# Patient Record
Sex: MALE | Race: WHITE | NOT HISPANIC OR LATINO | Employment: FULL TIME | URBAN - METROPOLITAN AREA
[De-identification: names, ages, dates, MRNs, and addresses within clinical notes are randomized per-mention and may not be internally consistent; named-entity substitution may affect disease eponyms.]

---

## 2021-05-31 ENCOUNTER — RECORDS - HEALTHEAST (OUTPATIENT)
Dept: ADMINISTRATIVE | Facility: CLINIC | Age: 56
End: 2021-05-31

## 2021-11-24 PROBLEM — G47.30 SLEEP APNEA WITH USE OF CONTINUOUS POSITIVE AIRWAY PRESSURE (CPAP): Status: ACTIVE | Noted: 2019-01-08

## 2021-12-09 ENCOUNTER — OFFICE VISIT (OUTPATIENT)
Dept: SURGERY | Facility: CLINIC | Age: 56
End: 2021-12-09
Payer: COMMERCIAL

## 2021-12-09 DIAGNOSIS — Z18.9 RETAINED SUTURE, INITIAL ENCOUNTER: Primary | ICD-10-CM

## 2021-12-09 DIAGNOSIS — T81.89XA RETAINED SUTURE, INITIAL ENCOUNTER: Primary | ICD-10-CM

## 2021-12-09 PROCEDURE — 99203 OFFICE O/P NEW LOW 30 MIN: CPT | Mod: 25 | Performed by: SURGERY

## 2021-12-09 PROCEDURE — 10120 INC&RMVL FB SUBQ TISS SMPL: CPT | Performed by: SURGERY

## 2021-12-10 NOTE — PROGRESS NOTES
HPI: Alexandr Dudley is a 56 year old male referred to see me by No primary care provider on file. for persistent subcutaneous sutures following umbilical hernia repair several years ago. This was a mesh-based umbilical hernia repair, with mesh fixation performed with Prolene suture. He has always been aware of the protruding tips of the sutures very shallow undersurface of the skin, and plan to have this corrected.    Allergies:Patient has no known allergies.    Prior Medical History: No past medical history on file.    Prior Surgical History:   Past Surgical History:   Procedure Laterality Date     KIDNEY STONE SURGERY       NOSE SURGERY         CURRENT MEDS:  Prior to Admission medications    Not on File         No family history on file.     reports that he has quit smoking. He quit smokeless tobacco use about 22 years ago. He reports that he does not drink alcohol.    History   Smoking Status     Former Smoker   Smokeless Tobacco     Former User     Quit date: 1/1/1999       Review of Systems -    The 10 point review of systems is within normal limits except as noted in HPI.    There were no vitals taken for this visit.  0 lbs 0 oz  There is no height or weight on file to calculate BMI.    EXAM:  GENERAL: Alert, cooperative, appears stated age  ABDOMEN: 2 palpable areas suture just below the epidermis.      Assessment/Plan:   1. Retained suture, initial encounter          Alexandr Dudley is a 56 year old male with retained suture that is continuing to cause irritation and discomfort. Certainly merits excision.      5 minutes spent on the date of the encounter doing chart review, patient visit and documentation, with approximately 15 minutes spent on the procedure to remove the sutures.    Stone Ruiz MD ,MD  [facility] Department of Surgery

## 2021-12-10 NOTE — PROCEDURES
The skin overlying the umbilicus was prepped with alcohol. We then proceeded to inject 5 cc of lidocaine into the skin around into bothersome sutures. A 1 cm incision was then made directly between these 2 sutures proceeded to dissect out and excise the visible palpable suture material. Once this was complete the incision was closed with Steri-Strips.    Stone Ruiz MD, FACS  Office: [phone]  [facility]